# Patient Record
Sex: MALE | Race: BLACK OR AFRICAN AMERICAN | Employment: UNEMPLOYED | URBAN - METROPOLITAN AREA
[De-identification: names, ages, dates, MRNs, and addresses within clinical notes are randomized per-mention and may not be internally consistent; named-entity substitution may affect disease eponyms.]

---

## 2022-09-11 ENCOUNTER — APPOINTMENT (OUTPATIENT)
Dept: CT IMAGING | Age: 62
End: 2022-09-11
Attending: EMERGENCY MEDICINE
Payer: MEDICARE

## 2022-09-11 ENCOUNTER — HOSPITAL ENCOUNTER (EMERGENCY)
Age: 62
Discharge: OTHER INSTITUTION | End: 2022-09-12
Attending: EMERGENCY MEDICINE
Payer: MEDICARE

## 2022-09-11 DIAGNOSIS — R93.0 ABNORMAL CT OF THE HEAD: ICD-10-CM

## 2022-09-11 DIAGNOSIS — R29.898 LEFT LEG WEAKNESS: Primary | ICD-10-CM

## 2022-09-11 DIAGNOSIS — Z86.73 H/O: STROKE: ICD-10-CM

## 2022-09-11 PROCEDURE — 99285 EMERGENCY DEPT VISIT HI MDM: CPT

## 2022-09-11 PROCEDURE — 70450 CT HEAD/BRAIN W/O DYE: CPT

## 2022-09-12 VITALS
DIASTOLIC BLOOD PRESSURE: 111 MMHG | RESPIRATION RATE: 15 BRPM | SYSTOLIC BLOOD PRESSURE: 165 MMHG | WEIGHT: 276 LBS | HEART RATE: 74 BPM | OXYGEN SATURATION: 97 %

## 2022-09-12 NOTE — ED NOTES
Gave report Clayborn Cockayne, RN at [facility]. Advised of CD sent to facility w/ D/C paperwork. Asked if anything changed, please contact facility. [phone]     [facility] arrived early to pickup patient. Advised facility.

## 2022-09-12 NOTE — ED PROVIDER NOTES
Sjigned out to me by dr Africa Martin pending head ct    Cth without acute findings    [facility] will accept with disc for comparison    Stable for dc
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
## 2022-09-12 NOTE — ED TRIAGE NOTES
Patient arrived via EMS from [facility] w/ c/o left leg weakness over the last 6 days. Patient was admitted on 8/27 for a stroke at [facility] where he was then transferred to [facility] for recover. VSS.